# Patient Record
Sex: MALE | Race: WHITE | NOT HISPANIC OR LATINO | Employment: OTHER | URBAN - METROPOLITAN AREA
[De-identification: names, ages, dates, MRNs, and addresses within clinical notes are randomized per-mention and may not be internally consistent; named-entity substitution may affect disease eponyms.]

---

## 2020-07-10 ENCOUNTER — TELEPHONE (OUTPATIENT)
Dept: NEUROLOGY | Facility: CLINIC | Age: 60
End: 2020-07-10

## 2020-08-04 ENCOUNTER — OFFICE VISIT (OUTPATIENT)
Dept: NEUROLOGY | Facility: CLINIC | Age: 60
End: 2020-08-04
Payer: MEDICAID

## 2020-08-04 DIAGNOSIS — F79 INTELLECTUAL DISABILITY: ICD-10-CM

## 2020-08-04 PROBLEM — I25.10 CORONARY ARTERY DISEASE: Status: ACTIVE | Noted: 2019-11-25

## 2020-08-04 PROCEDURE — 90791 PR PSYCHIATRIC DIAGNOSTIC EVALUATION: ICD-10-PCS | Mod: 95,HP,HB, | Performed by: CLINICAL NEUROPSYCHOLOGIST

## 2020-08-04 PROCEDURE — 99499 UNLISTED E&M SERVICE: CPT | Mod: 95,HP,HB, | Performed by: CLINICAL NEUROPSYCHOLOGIST

## 2020-08-04 PROCEDURE — 99499 NO LOS: ICD-10-PCS | Mod: 95,HP,HB, | Performed by: CLINICAL NEUROPSYCHOLOGIST

## 2020-08-04 PROCEDURE — 90791 PSYCH DIAGNOSTIC EVALUATION: CPT | Mod: 95,HP,HB, | Performed by: CLINICAL NEUROPSYCHOLOGIST

## 2020-08-04 NOTE — PROGRESS NOTES
NEUROPSYCHOLOGY CONSULT (TELEHEALTH)    Referral Information  Name: Seferino Pugh  MRN: [id number]  : 1960  Age: 59 y.o.  Race: White  Gender: male  Referring Provider: Rashad Elizabeth MD (Primary Care)  Billing: See below for details as coding/billing has changed   Telemedicine:   The patient location is: Home  The provider location is: [facility]  The chief complaint leading to consultation/medical necessity is: concern for cognitive impairment post-stroke  Visit type: Virtual visit with audio only (telephone)  The reason for the audio only service rather than synchronous audio and video virtual visit was related to patient preference and lack of technology access  Total time spent with patient: 50-minutes  Each patient to whom he or she provides medical services by telemedicine is:  (1) informed of the relationship between the physician and patient and the respective role of any other health care provider with respect to management of the patient; and (2) notified that he or she may decline to receive medical services by telemedicine and may withdraw from such care at any time.  Consent/Emergency Plan: The patient expressed an understanding of the purpose of the evaluation and consented to all procedures. I informed the patient of limits to confidentiality and discussed an emergency plan.      SUMMARY/TREATMENT PLAN   Results from the interview indicate the following diagnoses and treatment plan recommendations. This was discussed with patient and his brother in law Sy today.     Diagnoses/Plan:  Problem List Items Addressed This Visit        Neuro    Major vascular neurocognitive disorder, possible    Current Assessment & Plan     Assessment:  -family notes significant change in cognition particular run executive functioning after his 2019 basal ganglia stroke  -of concern, however, is that family notes that he is not improving and is in fact worsening over time with respect to his cognition and  functional abilities  Plan:  >>Neuropsychology:    --Testing to help determine the patient's cognitive status along with potentially assessing to what degree he had baseline cognitive difficulty prior to the stroke (question of intellectual disability via record review) that may complicate recovery course and need for updated and more tailored treatment plan           Intellectual disability    Current Assessment & Plan     Assessment:  -family notes significant change in cognition particular run executive functioning after his December 2019 basal ganglia stroke  -of concern, however, is that family notes that he is not improving and is in fact worsening over time with respect to his cognition and functional abilities  -additionally, his brother-in-law and medical records referenced some pre-stroke intellectual or cognitive trouble prior that need assessment as it will inform overall diagnosis/treatment plan  Plan:  >>Neuropsychology:    --Testing to help determine the patient's cognitive status along with potentially assessing to what degree he had baseline cognitive difficulty prior to the stroke (question of intellectual disability via record review) that may complicate recovery course and need for updated and more tailored treatment plan                 HISTORY OF PRESENT ILLNESS AND CURRENT SYMPTOMS     Mr. Pugh has active problems noted below.     Cognitive Symptoms:   Onset:   o Per Pt: Onset of thinking trouble was difficult for him to describe.   o Per Vasiliy (Brother in Law): Onset of cognitive trouble started post-CVA in 12/2019. He described executive dysfunction (not thinking things through as well; having trouble following instructions well; gets frustrated with changes and has a hard time adapting; less motivated) and short-term memory trouble (forgetting conversations, activities).   - Note: Vasiliy identified potential baseline learning trouble but very hard to characterize.   Course:  o Per Vasiliy:  "Feels sxs are worse NOT better over the past 7 months  o Per Pt: Uncertain about course    Current Functional Status/Needs:  ADLs  Self-Care Eating Safety Other   Independent Independent None NA     Instrumental IADLs:   Driving Medications/Health Household Finances   -Limited to familiar places  -Some anxiety when driving  -This is a change for him -Independent -Some shopping -Brother in law has managed for about 10-years due to his trouble with management that is longstanding (e.g., susceptibility to scams and spending money without making it through     No flowsheet data found.    Psychiatric/Behavioral Symptoms:  Mood:  Depression/Dysphoria Anxiety/Fearfulness Irritability   -None -None -Nothing major     No flowsheet data found.  No flowsheet data found.    Behavior:  Agitation/Resistance Delusions/Paranoia Hallucinations   -None -None -None     Apathy/Motivation Repetitive/Restlessness Other   -Yes, moderate sx -None -NA     No flowsheet data found.    Neurovegetative:  Sleep/Nighttime  [employer]   "Pretty good" WNL -Adequate      Suicidal/Homicidal Ideation: NA    Physical Symptoms: None     PERTINENT BACKGROUND INFORMATION   SOCIAL HISTORY    · Family Status:  in 2005 ( for 5-years) + No children  · Current Living Situation: Lives with sister and brother-in-law for >10-years  · Primary Source of Support: Sister and brother-in-law  · Daily Activities: Reduced some after his 12/2019 stroke and [facility];  television but is more idle/  · Stressors: None  · Other Factors:  · Educational Level: Completed 11th grade   · No special education but learning history seems unclear   · Occupational Status and History:   · Retired after his stroke b/c of cognitive symptoms (e.g., wasn't following instructions as well; was forgetting things after he was told)  · 21-years,  at [employer]   · Other: NA    Family History   Problem Relation Age of Onset    Stroke Father     Hypertension " "Father     Diabetes Father     Diabetes Mother     Heart disease Mother     Kidney disease Sister      Family Neurologic History: See above  Family Psychiatric History: Negative for heritable risk factors    MEDICAL STATUS  Patient Active Problem List   Diagnosis    Renal calculi    Renal stone    BPH with urinary obstruction    Renal calculus, left    Retroperitoneal fluid collection    Right nephrolithiasis    Nephrolithiasis    Coronary artery disease    Major vascular neurocognitive disorder, possible    Intellectual disability     Past Medical History:   Diagnosis Date    Kidney stone      Past Surgical History:   Procedure Laterality Date    KIDNEY STONE SURGERY      LEG SURGERY  1982    right    MENISCECTOMY         Updated/Relevant Neurologic History:  · Falls: None  · TBI: None  · Seizures: None  · Stroke: Yes, CVA in 12/2019 [No records and no imaging available, but one report notes "basal ganglia stroke"  · Admitted for about a week that including a heart valve replacement and pace maker  · At discharge, he had OT and Speech Therapy (for cognitive sxs) for about 90-days    · Movement Concerns: None    Recent Labs and Imaging  No results found for: [id number]  No results found for: RPR  No results found for: FOLATE  No results found for: TSH, P4IYHMP, W1IEUKM, THYROIDAB  No results found for: LABA1C, HGBA1C  No results found for: HIV1X2, NIK55IOPM    Current Medications    Current Outpatient Medications:     CELECOXIB (CELEBREX ORAL), Take by mouth., Disp: , Rfl:     oxycodone-acetaminophen (PERCOCET) 5-325 mg per tablet, Take 1 tablet by mouth every 4 (four) hours as needed for Pain., Disp: 35 tablet, Rfl: 0    tamsulosin (FLOMAX) 0.4 mg Cp24, Take 1 capsule (0.4 mg total) by mouth once daily., Disp: 30 capsule, Rfl: 2    Lisinopril (5mg), Namenda (10mg), atorvastatin (20mg), clopidogril (75mg), ASA (81mg), and metoprolol (25mg)    Updated/Relevant Psychiatric History: None    MENTAL " "STATUS AND OBSERVATIONS:  APPEARANCE: Casually dressed and adequate grooming/hygiene.   ALERTNESS/ORIENTATION: Attentive and alert. Fully oriented (x5) to time and place  GAIT: Not assessed  MOTOR MOVEMENTS/MANNERISMS: Not assessed  SPEECH/LANGUAGE: Normal in rate, rhythm, tone, and volume. However, reduced spontaneous speech and often deferred to his brother in law for history/information.  STATED MOOD/AFFECT: The patients stated mood was "good." Affect was not assessed via phone visit apart from vocal tone seeming euthymic.   INTERPERSONAL BEHAVIOR: Rapport was quickly and easily established   SUICIDALITY/HOMICIDALITY: Denied  HALLUCINATIONS/DELUSIONS: None evidenced or endorsed  THOUGHT PROCESSES/INSIGHT: Thoughts seemed logical and goal-directed. But, reduced insight/concern about his cognitive sxs.       BILLING  Service Description CPT Code Minutes Units   Psychiatric diagnostic evaluation by physician [id number] [phone]   Neurobehavioral status exam by physician [id number]  0   Each additional hour by physician [id number]  0   Test Evaluation Services --  --   Neuropsychological testing evaluation services by physician [id number]  0   Each additional hour by physician [id number]  0   Test Administration and Scoring --  --   Psychological or neuropsychological test administration and scoring by physician [id number]  0   Each additional 30 minutes by physician [id number]  0   Psychological or neuropsychological test administration and scoring by technician [id number]  0   Each additional 30 minutes by technician [id number]  0                   "

## 2020-08-06 PROBLEM — F79 INTELLECTUAL DISABILITY: Status: ACTIVE | Noted: 2020-08-06

## 2020-08-06 NOTE — ASSESSMENT & PLAN NOTE
Assessment:  -family notes significant change in cognition particular run executive functioning after his December 2019 basal ganglia stroke  -of concern, however, is that family notes that he is not improving and is in fact worsening over time with respect to his cognition and functional abilities  -additionally, his brother-in-law and medical records referenced some pre-stroke intellectual or cognitive trouble prior that need assessment as it will inform overall diagnosis/treatment plan  Plan:  >>Neuropsychology:    --Testing to help determine the patient's cognitive status along with potentially assessing to what degree he had baseline cognitive difficulty prior to the stroke (question of intellectual disability via record review) that may complicate recovery course and need for updated and more tailored treatment plan

## 2020-08-06 NOTE — ASSESSMENT & PLAN NOTE
Assessment:  -family notes significant change in cognition particular run executive functioning after his December 2019 basal ganglia stroke  -of concern, however, is that family notes that he is not improving and is in fact worsening over time with respect to his cognition and functional abilities  Plan:  >>Neuropsychology:    --Testing to help determine the patient's cognitive status along with potentially assessing to what degree he had baseline cognitive difficulty prior to the stroke (question of intellectual disability via record review) that may complicate recovery course and need for updated and more tailored treatment plan

## 2020-08-13 ENCOUNTER — TELEPHONE (OUTPATIENT)
Dept: NEUROLOGY | Facility: CLINIC | Age: 60
End: 2020-08-13

## 2020-08-18 ENCOUNTER — INITIAL CONSULT (OUTPATIENT)
Dept: NEUROLOGY | Facility: CLINIC | Age: 60
End: 2020-08-18
Payer: MEDICAID

## 2020-08-18 DIAGNOSIS — R41.83 BORDERLINE INTELLECTUAL FUNCTIONING: ICD-10-CM

## 2020-08-18 PROCEDURE — 96133 PR NEUROPSYCHOLOGIC TEST EVAL SVCS, EA ADDTL HR: ICD-10-PCS | Mod: HP,HB,, | Performed by: CLINICAL NEUROPSYCHOLOGIST

## 2020-08-18 PROCEDURE — 96138 PSYCL/NRPSYC TECH 1ST: CPT | Mod: HP,HB,, | Performed by: CLINICAL NEUROPSYCHOLOGIST

## 2020-08-18 PROCEDURE — 96132 PR NEUROPSYCHOLOGIC TEST EVAL SVCS, 1ST HR: ICD-10-PCS | Mod: HP,HB,, | Performed by: CLINICAL NEUROPSYCHOLOGIST

## 2020-08-18 PROCEDURE — 96133 NRPSYC TST EVAL PHYS/QHP EA: CPT | Mod: HP,HB,, | Performed by: CLINICAL NEUROPSYCHOLOGIST

## 2020-08-18 PROCEDURE — 96139 PSYCL/NRPSYC TST TECH EA: CPT | Mod: HP,HB,, | Performed by: CLINICAL NEUROPSYCHOLOGIST

## 2020-08-18 PROCEDURE — 96138 PR PSYCH/NEUROPSYCH TEST ADMIN/SCORING, BY TECH, 2+ TESTS, 1ST 30 MIN: ICD-10-PCS | Mod: HP,HB,, | Performed by: CLINICAL NEUROPSYCHOLOGIST

## 2020-08-18 PROCEDURE — 96139 PR PSYCH/NEUROPSYCH TEST ADMIN/SCORING, BY TECH, 2+ TESTS, EA ADDTL 30 MIN: ICD-10-PCS | Mod: HP,HB,, | Performed by: CLINICAL NEUROPSYCHOLOGIST

## 2020-08-18 PROCEDURE — 96132 NRPSYC TST EVAL PHYS/QHP 1ST: CPT | Mod: HP,HB,, | Performed by: CLINICAL NEUROPSYCHOLOGIST

## 2020-08-18 PROCEDURE — 99499 UNLISTED E&M SERVICE: CPT | Mod: HP,HB,S$PBB, | Performed by: CLINICAL NEUROPSYCHOLOGIST

## 2020-08-18 PROCEDURE — 96116 NUBHVL XM PHYS/QHP 1ST HR: CPT | Mod: HP,HB,S$PBB, | Performed by: CLINICAL NEUROPSYCHOLOGIST

## 2020-08-18 PROCEDURE — 99499 NO LOS: ICD-10-PCS | Mod: HP,HB,S$PBB, | Performed by: CLINICAL NEUROPSYCHOLOGIST

## 2020-08-18 PROCEDURE — 96116 NUBHVL XM PHYS/QHP 1ST HR: CPT | Mod: PBBFAC | Performed by: CLINICAL NEUROPSYCHOLOGIST

## 2020-08-18 PROCEDURE — 96116 PR NEUROBEHAVIORAL STATUS EXAM BY PSYCH/PHYS: ICD-10-PCS | Mod: HP,HB,S$PBB, | Performed by: CLINICAL NEUROPSYCHOLOGIST

## 2020-08-19 NOTE — PROGRESS NOTES
NEUROPSYCHOLOGICAL EVALUATION    Referral Information  Name: Seferino Pugh  MRN: [id number]  : 1960  Age: 59 y.o.  Race: White  Gender: male  Referring Provider: Rashad Elizabeth MD (Primary Care)  Billing: See below for details as coding/billing has changed   The chief complaint leading to consultation/medical necessity is: concern for cognitive impairment post-stroke  Visit type: Testing, interview with family (sister, brother in law in person), Report, and Treatment Plan with Feedback Scheduled to discuss findings/results. Initial visit via virtual visit with patient and brother in law    SUMMARY/TREATMENT PLAN   Results indicate the following diagnoses and treatment plan recommendations. This will be discussed with patient, sister and and his brother in law during feedback.    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 This means the patient likely had below average intelligence which can place him at greater risk of cognitive decline particularly after a stroke.  >>Current testing shows impairment and likely decline from baseline. Specifically, scores show more difficulty frontal-subcortical cognitive skills (e.g., complex attention, executive functions like planning, shifting, organizing) along with some degree of greater right hemisphere dysfunction (e.g., more trouble with visual/spatial skills and visual memory compared to language skills verbal memory).   >>Fortunately, his basic attention, language skills, reading level, and memory remain within normal limits for him.   -Etiology/Diagnosis:   >>Certainly, his frontal-subcortical stroke (basal ganglia, likely right basal ganglia but imaging unavailable) is the primary cause.   >>While family  reports continued decline, its unclear if this is progressively worsening or him not improving post-stroke. Nevertheless, his testing does NOT show a degenerative process, like Alzhiemer's at this time.   >>Given his decline for IADLs, he has a dementia.    Plan:  >>Neuropsych: Will review results and treatment plan with sister/brother-in-law/patient    >>Primary Care: Follow as usual.     >>Nutrition:  Will discuss prioritizing Mediterranean diet for Brain Health and stroke prevention in the future. Of course, his sister/family will have to implement.    >>Structure:  Given his significant reduction in daily activities both by not working and related to COVID, it is understandable that his mood is low.  Additionally, his executive dysfunction post stroke also make it difficult for him to independently organize, plan, andthink of different activities.  Will discuss with the family using a behavioral activation approach along with scheduling and routine building to encourage more activity engagement.    Recommendations for Caregivers/Family:      >>Practice good cognitive/brain health hygiene:   1. Engage in regular exercise, which increases alertness and arousal and can improve attention and focus.   2. Develop a consistent daily/weekly routine,  3. Eat healthy foods and balanced meals. Talk with your physician or nutritionist about whats right for you, but a Mediterranean diet has been found to be most effective at promoting brain health.  4. Keep your brain active. Find activities appropriate to stay mentally active.  5. Stay socially engaged. Continue staying active with your family and friends.                 Borderline intellectual functioning    Current Assessment & Plan     -longstanding dx               HISTORY OF PRESENT ILLNESS AND CURRENT SYMPTOMS     Mr. Pugh has active problems noted below.     Cognitive Symptoms:   Onset:   o Per Pt: Onset of thinking trouble was difficult for him to describe.    o Per Vasiliy (Brother in Law, via phone interview): Onset of cognitive trouble started post-CVA in 12/2019. He described executive dysfunction (not thinking things through as well; having trouble following instructions well; gets frustrated with changes and has a hard time adapting; less motivated) and short-term memory trouble (forgetting conversations, activities).   - Note: Vasiliy identified potential baseline learning trouble but very hard to characterize.  o Per Sister (Tanya): Onset of cognitive trouble was in the developmental period. She reported longstanding likely learning/intellectual limitations [place]e.g., minimal assessments in schools in the 1950s). The patient could generally function well at work and with supports from his mother, wife (now ex-wife), and now sister/brother-in-law regarding complex tasks (finances, contracts, planning, etc.)    Course:  o Per Vasiliy: Feels sxs are worse NOT better over the past 7 months since his stroke in December 2019.  o Per Pt: Uncertain about course  o Per Sister: She notes no major changes in his cognition until the stroke in December 2019. Since then, they have noticed a lot more trouble with with executive functioning (adapting to changes) and attention/working memory (e.g., carrying out instructions accurately, following along/comprehending in conversation without a lot of repetition/slowing down).    Current Functional Status/Needs:  ADLs  Self-Care Eating Safety Other   Independent but does need occasional prompting around hygiene Independent None NA     Instrumental IADLs:   Driving Medications/Health Household Finances   -Limited to familiar places now  -Some anxiety when driving  -This is a change for him as prior to stroke a generally drove without any difficulty -Independent -Some shopping -Brother in law has managed for about 10-years due to his trouble with management that is longstanding (e.g.,  susceptibility to scams and spending money without making it through     No flowsheet data found.    Psychiatric/Behavioral Symptoms:  Mood:  Depression/Dysphoria Anxiety/Fearfulness Irritability   -None -None -Nothing major     PHQ9 8/19/2020   Total Score 5     GAD7 8/19/2020   1. Feeling nervous, anxious, or on edge? 0   2. Not being able to stop or control worrying? 0   3. Worrying too much about different things? 0   4. Trouble relaxing? 0   5. Being so restless that it is hard to sit still? 0   6. Becoming easily annoyed or irritable? 0   7. Feeling afraid as if something awful might happen? 0   ABEL-7 Score 0       Behavior:  Agitation/Resistance Delusions/Paranoia Hallucinations   -None -None -None     Apathy/Motivation Repetitive/Restlessness Other   -Yes, moderate sx -None -NA     NPIQ RFS 8/19/2020   WHO IS FILLING OUT FORM? PWD   Does this patient have false beliefs, such as thinking that others are stealing from him/her or planning to harm him/her in some way? No   Does this patient have hallucinations such as false visions or voices? Yoder she/he seem to hear or see things that are not present? No   Is the patient resistive to help from others at times, or hard to handle? No   Does the patient seem sad or say that he/she is depressed? No   Does the patient become upset when  from you? Does he/she have any other signs of nervousness such as shortness of breath, sighing, being unable tor elax, or feeling excessively tense? No   Does the patient appear to feel good or act excessively happy? No   Does this patient seem less interested in his/her usual activities or in the activities and plans of others? Yes   Apathy/Indifference Severity 1   Apathy/Indifference Distress 1   Does this patient seem to act cumpolsively, for example, talking to strangers as if she/he knows them, or saying things that may hurt people's feelings? No   Is the patient impatient and cranky? Does he/she have difficulty coping  "with delays or waiting for planned activities? Yes   Irritability/Liability Severity 1   Irritability/Liability Distress 3   Does the patient engage in repetitive activities such as pacing around the house, handling buttons, wrapping string, or doing other things repeatedly? No   Does this patient awaken you during the night, rise too early in the morning, or take excessive naps during the day? No   Has the patient lost or gained weight, or had a change in the type of food he/she likes? Yes   Apetitie/Eating Severity 1   Apetite/Eating Distress 2   NPI Total Severity Score 3   NPI Total Distress Score 6       Neurovegetative:  Sleep/Nighttime  Appetite Energy   "Pretty good" WNL -Adequate      Suicidal/Homicidal Ideation: NA    Physical Symptoms: None     PERTINENT BACKGROUND INFORMATION   SOCIAL HISTORY    · Family Status:  in 2005 ( for 5-years) + No children  · Current Living Situation: Lives with sister and brother-in-law for >10-years  · Primary Source of Support: Sister and brother-in-law  · Daily Activities: Reduced some after his 12/2019 stroke and long-term;  television but is more idle/  · Stressors: None  · Other Factors:  · Educational Level: Completed 11th grade   · No special education but learning history seems unclear   · Occupational Status and History:   · Retired after his stroke b/c of cognitive symptoms (e.g., wasn't following instructions as well; was forgetting things after he was told)  · 21-years,  at [employer]   · Other: NA    Family History   Problem Relation Age of Onset    Stroke Father     Hypertension Father     Diabetes Father     Diabetes Mother     Heart disease Mother     Kidney disease Sister      Family Neurologic History: See above  Family Psychiatric History: Negative for heritable risk factors    MEDICAL STATUS  Patient Active Problem List   Diagnosis    Renal calculi    Renal stone    BPH with urinary obstruction    Renal calculus, " "left    Retroperitoneal fluid collection    Right nephrolithiasis    Nephrolithiasis    Coronary artery disease    Major vascular neurocognitive disorder, possible    Borderline intellectual functioning     Past Medical History:   Diagnosis Date    Kidney stone      Past Surgical History:   Procedure Laterality Date    KIDNEY STONE SURGERY      LEG SURGERY  1982    right    MENISCECTOMY         Updated/Relevant Neurologic History:  · Falls: None  · TBI: None  · Seizures: None  · Stroke: Yes, CVA in 12/2019 [No records and no imaging available, but one report notes "basal ganglia stroke"  · Admitted for about a week that including a heart valve replacement and pace maker  · At discharge, he had OT and Speech Therapy (for cognitive sxs) for about 90-days    · Movement Concerns: None    Recent Labs and Imaging (none on file and not searchable via Care Everywhere)  No results found for: BDPQNRNB22  No results found for: RPR  No results found for: FOLATE  No results found for: TSH, K3VRPLO, G0XLXHR, THYROIDAB  No results found for: LABA1C, HGBA1C  No results found for: HIV1X2, IMU64WZLY    Current Medications    Current Outpatient Medications:     CELECOXIB (CELEBREX ORAL), Take by mouth., Disp: , Rfl:     oxycodone-acetaminophen (PERCOCET) 5-325 mg per tablet, Take 1 tablet by mouth every 4 (four) hours as needed for Pain., Disp: 35 tablet, Rfl: 0    tamsulosin (FLOMAX) 0.4 mg Cp24, Take 1 capsule (0.4 mg total) by mouth once daily., Disp: 30 capsule, Rfl: 2    Lisinopril (5mg), Namenda (10mg), atorvastatin (20mg), clopidogril (75mg), ASA (81mg), and metoprolol (25mg)    Updated/Relevant Psychiatric History: None    MENTAL STATUS AND OBSERVATIONS:  APPEARANCE: Casually dressed and adequate grooming/hygiene.   ALERTNESS/ORIENTATION: Attentive and alert. Fully oriented (x5) to time and place  GAIT:  Unremarkable  MOTOR MOVEMENTS/MANNERISMS:  Unremarkable  SPEECH/LANGUAGE: Normal in rate, rhythm, and volume. " "Tone was monotone and flat. Reduced spontaneous speech and often deferred to his brother in law for history/information. When talking, it was often around circumscribed interests (e.g., sports).  STATED MOOD/AFFECT: The patients stated mood was "good." Affect was not assessed via phone visit apart from vocal tone seeming euthymic.   INTERPERSONAL BEHAVIOR: Rapport was quickly and easily established   SUICIDALITY/HOMICIDALITY: Denied  HALLUCINATIONS/DELUSIONS: None evidenced or endorsed  THOUGHT PROCESSES/INSIGHT: Thoughts seemed logical and goal-directed. But, reduced insight/concern about his cognitive sxs.   TEST TAKING BEHAVIOR and VALIDITY: Freestanding and embedded performance validity measures and observation of effort were suggestive of adequate engagement. The current results, therefore, are likely a valid reflection of the patient's current functioning.     PROCEDURES/TESTS ADMINISTERED   In addition to performing a review of pertinent medical records, reviewing limits to confidentiality, conducting a clinical interview, and explaining procedures, the following measures were administered: [place] Cognitive Assessment (MoCA), Wide Range Achievement Test - Fourth Edition (WRAT-IV; Green Form) Reading Test; Frontal Assessment Battery (DENNIS); Wechsler Adult Intelligence Scale-IV (Digit Span, Matrix Reasoning, Information, Similarities); Trail Making Test, parts A and B (Isabel et al., 2004 norms); NAB Naming Test; Category and Letter-cued verbal fluency (animal naming/FAS; Isabel et al., 2004 norms); Repeatable Battery for the Assessment of Neuropsychological Status (RBANS: A: Update); NPI-2, ABEL-7, PHQ-9 Manual norms were used unless otherwise indicated.  Review data Appendix Below for scores and percentiles.     TEST RESULTS     Pre-Morbid Functioning    Word reading was average range but this is inconsistent with his developmental history per his sister   Score on a fund of information task was in the " mildly impaired range   Score on visual reasoning task was also in the mildly impaired range   Mental Status:    MoCA=17/30   Attention/Working Memory:  Simple aspects of attention were normal   More complex attention (sequencing, working memory) were impaired   Processing or Mental Speed  Extremely slow processing speed across all tasks   Language    Basic expressive and receptive language was normal on observation   Naming was normal   Phonemic fluency was borderline range   Semantic fluency was just below average   Verbal reasoning was impaired   Visuospatial/  Construction:  He could not accurately copy a simple 3D drawing   Copy of a complex figure was quite impaired showed poor overall gestalt and hyper focus on details   Learning and Memory:    While below average, his learning and memory for verbal information seems within expectations given his pre-morbid background.  Furthermore, memory improved with structure in cues to the normal range   Visual memory, however, was quite impaired   Executive or Frontal-lobe Functions  He had significant trouble drawing a clock to command.  On observation, he could conceptually understand the task (knew lines went to 10/2), but could not plan, sequence and execute the steps   He could not complete a set shifting task   Verbal and visual reasoning were impaired   Psychiatric or Behavioral Symptoms  The patient denied any symptoms   Family report some mild sadness given that he is not able to work and feeling less of a purpose     BILLING     Service Description CPT Code Minutes Units   Psychiatric diagnostic evaluation by physician [id number]     Neurobehavioral status exam by physician [id number] [phone]   Each additional hour by physician [id number]  0   Test Evaluation Services --  --   Neuropsychological testing evaluation services by physician [id number] [phone] 1   Each additional hour by physician [id number] [phone] 1   Test Administration and Scoring --  --   Psychological or  neuropsychological test administration and scoring by physician [id number]  0   Each additional 30 minutes by physician [id number]  0   Psychological or neuropsychological test administration and scoring by technician [id number] [phone] 1   Each additional 30 minutes by technician [id number] [phone]         DATA APPENDIX:   Note: It is important to note that scores/percentiles should only be interpreted by a neuropsychologist. It is common for healthy individuals to have 1-3 isolated low/unusual scores that are not indicative of any significant cognitive dysfunction.      Raw Score Type of Standardized Score Standardized Score   RBANS Effort Index Pass - -   PREMORBID FUNCTIONING Raw Score Type of Standardized Score Standardized Score   WRAT-4 Reading 61 SS 99.00   INTELLECTUAL FUNCTIONING Raw Score Type of Standardized Score Standardized Score   WAIS-IV      VCI - SS 70   Similarities 11 ss 4   Information 6 ss 5   Matrix Reasoning 4 ss 3   Digit Span 17 ss 5         DS Forward 7 ss 7         DS Backward 7 ss 8         DS Sequence 3 ss 4         Longest Digit Forward 5 - -         Longest Digit Backward 4 - -         Longest Digit Sequence 4 - -   ACADEMIC ACHIEVEMENT Raw Score Type of Standardized Score Standardized Score   WRAT-4      Reading 61 SS 99   COGNITIVE SCREENING Raw Score Type of Standardized Score Standardized Score   MoCA 17/30 - -   Orientation - Place 2/2 - -   Orientation - Date 4/4 - -   RBANS      Immediate Memory - SS 85   VS/Construction - SS 50   Language - SS 96   Attention - SS 56   Delayed Memory - SS 86   Total Scale - SS 68   Subtests      List Learning 22 ss 6   Story Memory 16 ss 9   Figure Copy 8 ss 1   Line Orientation 2 ss -   Naming 10 ss -   Fluency 18 ss 8   Digit Span 6 ss 4   Coding 21 ss 3   List Recall 3 ss -   List Recognition 19 ss -   Story Recall 8 ss 9   Figure Recall 2 ss 2   LANGUAGE FUNCTIONING Raw Score Type of Standardized Score Standardized Score   WAIS-IV VCI - SS 70   WAIS-IV Similarities  11 ss 4   WAIS-IV Information 6 ss 5   RBANS Naming 10 ss -   RBANS Semantic Fluency 18 ss 8   NAB Naming 31 Tscore 56   FAS 24 Tscore 38   Animal Naming 14 Tscore 41   VISUOSPATIAL FUNCTIONING Raw Score Type of Standardized Score Standardized Score   RBANS Line Orientation  2 ss -   RBANS Figure Copy 8 ss 1   WAIS-IV ANISA - SS N/A   WAIS-IV Matrix Reasoning 4 ss 3   LEARNING & MEMORY Raw Score Type of Standardized Score Standardized Score   RBANS      Immediate Memory - SS 85   Delayed Memory - SS 86   List Learning 22 ss 6   List Recall 3 ss -   List Recognition 19 ss -   Story Memory 16 ss 9   Story Recall 8 ss 9   Figure Recall 2 ss 2   ATTENTION/WORKING MEMORY Raw Score Type of Standardized Score Standardized Score   WAIS-IV Digit Span 17 ss 5         DS Forward 7 ss 7         DS Backward 7 ss 8         DS Sequence 3 ss 4         Longest Digit Forward 5 - -         Longest Digit Backward 4 - -         Longest Digit Sequence 4 - -   RBANS Digit Span  6 ss 4   MENTAL PROCESSING SPEED Raw Score Type of Standardized Score Standardized Score   WAIS-IV PSI - SS N/A   RBANS Coding 21 ss 3   TMT A  76 Tscore 27   TMT A errors 0 - -   EXECUTIVE FUNCTIONING Raw Score Type of Standardized Score Standardized Score   TMT B D/C Tscore #N/A   TMT B errors D/C - -   MOOD & PERSONALITY Raw Score Type of Standardized Score Standardized Score   PHQ-9 5 - -   ABEL-7 0 - -

## 2020-08-20 PROBLEM — R41.83 BORDERLINE INTELLECTUAL FUNCTIONING: Status: ACTIVE | Noted: 2020-08-06

## 2020-08-20 NOTE — ASSESSMENT & PLAN NOTE
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 This means the patient likely had below average intelligence which can place him at greater risk of cognitive decline particularly after a stroke.  >>Current testing shows impairment and likely decline from baseline. Specifically, scores show more difficulty frontal-subcortical cognitive skills (e.g., complex attention, executive functions like planning, shifting, organizing) along with some degree of greater right hemisphere dysfunction (e.g., more trouble with visual/spatial skills and visual memory compared to language skills verbal memory).   >>Fortunately, his basic attention, language skills, reading level, and memory remain within normal limits for him.   -Etiology/Diagnosis:   >>Certainly, his frontal-subcortical stroke (basal ganglia, likely right basal ganglia but imaging unavailable) is the primary cause.   >>While family reports continued decline, its unclear if this is progressively worsening or him not improving post-stroke. Nevertheless, his testing does NOT show a degenerative process, like Alzhiemer's at this time.   >>Given his decline for IADLs, he has a dementia.    Plan:  >>Neuropsych: Will review results and treatment plan with sister/brother-in-law/patient    >>Primary Care: Follow as usual.     >>Nutrition:  Will discuss prioritizing Mediterranean diet for Brain Health and stroke prevention in the future. Of course, his sister/family will have to implement.    >>Structure:  Given his significant reduction in daily activities both by not working and related to COVID, it is understandable that his mood is low.  Additionally, his executive dysfunction post stroke also make it difficult for him to independently organize, plan, andthink of different activities.  Will discuss with the family using a behavioral activation approach along with scheduling and  routine building to encourage more activity engagement.    Recommendations for Caregivers/Family:      >>Practice good cognitive/brain health hygiene:   1. Engage in regular exercise, which increases alertness and arousal and can improve attention and focus.   2. Develop a consistent daily/weekly routine,  3. Eat healthy foods and balanced meals. Talk with your physician or nutritionist about whats right for you, but a Mediterranean diet has been found to be most effective at promoting brain health.  4. Keep your brain active. Find activities appropriate to stay mentally active.  5. Stay socially engaged. Continue staying active with your family and friends.

## 2020-10-09 ENCOUNTER — OFFICE VISIT (OUTPATIENT)
Dept: NEUROLOGY | Facility: CLINIC | Age: 60
End: 2020-10-09
Payer: MEDICAID

## 2020-10-09 DIAGNOSIS — R41.83 BORDERLINE INTELLECTUAL FUNCTIONING: Primary | ICD-10-CM

## 2020-10-09 PROCEDURE — 99499 NO LOS: ICD-10-PCS | Mod: 95,,, | Performed by: CLINICAL NEUROPSYCHOLOGIST

## 2020-10-09 PROCEDURE — 99499 UNLISTED E&M SERVICE: CPT | Mod: 95,,, | Performed by: CLINICAL NEUROPSYCHOLOGIST

## 2020-10-09 NOTE — Clinical Note
Patient's family wanted to reschedule this appt for a day his sister would be there. If you can call them and schedule that. thanks

## 2020-10-13 NOTE — PROGRESS NOTES
Contacted patient and brother in law to discuss results. Requested reschedule appt so patient's sister could join.

## 2020-11-10 ENCOUNTER — OFFICE VISIT (OUTPATIENT)
Dept: NEUROLOGY | Facility: CLINIC | Age: 60
End: 2020-11-10
Payer: MEDICAID

## 2020-11-10 DIAGNOSIS — R41.83 BORDERLINE INTELLECTUAL FUNCTIONING: Primary | ICD-10-CM

## 2020-11-10 PROCEDURE — 99499 UNLISTED E&M SERVICE: CPT | Mod: GT,95,, | Performed by: CLINICAL NEUROPSYCHOLOGIST

## 2020-11-10 PROCEDURE — 99499 NO LOS: ICD-10-PCS | Mod: GT,95,, | Performed by: CLINICAL NEUROPSYCHOLOGIST

## 2020-11-10 NOTE — PROGRESS NOTES
Established Patient - Audio Only Telehealth Visit    NEUROPSYCHOLOGY FEEDBACK (TELEHEALTH)    Referral Information  Name: Seferino Pugh  MRN: [id number]  : 1960  Age: 60 y.o.  Billing: Charges for Neuropsychology Feedback billed on 10/9/2020    Telemedicine:   The patient location is: Home  The provider location is: [facility] [place]  The chief complaint leading to consultation/medical necessity is: Feedback session for results/treatment plan discussion  Visit type: Virtual visit with audio only (telephone)   The reason for the audio only service rather than synchronous audio and video virtual visit was related to patient preference/necessity.  Total time spent with patient: 50-minutes with sister and brother-in-law and patient  Each patient to whom he or she provides medical services by telemedicine is:  (1) informed of the relationship between the physician and patient and the respective role of any other health care provider with respect to management of the patient; and (2) notified that he or she may decline to receive medical services by telemedicine and may withdraw from such care at any time.  Consent/Emergency Plan: The patient expressed an understanding of the purpose of the evaluation and consented to all procedures. I informed the patient of limits to confidentiality and discussed an emergency plan.    Note: Review Neuropsychology Consult dated for 10/9/2020 details of feedback discussion. No further neuropsychology feedback needed.                   This service was not originating from a related E/M service provided within the previous 7 days nor will  to an E/M service or procedure within the next 24 hours or my soonest available appointment.  Prevailing standard of care was able to be met in this audio-only visit.

## 2021-05-06 ENCOUNTER — PATIENT MESSAGE (OUTPATIENT)
Dept: RESEARCH | Facility: HOSPITAL | Age: 61
End: 2021-05-06

## 2021-05-10 ENCOUNTER — PATIENT MESSAGE (OUTPATIENT)
Dept: RESEARCH | Facility: HOSPITAL | Age: 61
End: 2021-05-10